# Patient Record
Sex: MALE | Race: WHITE
[De-identification: names, ages, dates, MRNs, and addresses within clinical notes are randomized per-mention and may not be internally consistent; named-entity substitution may affect disease eponyms.]

---

## 2018-02-14 ENCOUNTER — HOSPITAL ENCOUNTER (EMERGENCY)
Dept: HOSPITAL 41 - JD.ED | Age: 38
LOS: 1 days | Discharge: ANOTHER HOSPITAL | End: 2018-02-15
Payer: COMMERCIAL

## 2018-02-14 VITALS — DIASTOLIC BLOOD PRESSURE: 86 MMHG | SYSTOLIC BLOOD PRESSURE: 118 MMHG

## 2018-02-14 DIAGNOSIS — T42.4X2A: Primary | ICD-10-CM

## 2018-02-14 DIAGNOSIS — I10: ICD-10-CM

## 2018-02-14 DIAGNOSIS — F17.210: ICD-10-CM

## 2018-02-14 DIAGNOSIS — Z79.899: ICD-10-CM

## 2018-02-14 DIAGNOSIS — B20: ICD-10-CM

## 2018-02-14 DIAGNOSIS — E78.00: ICD-10-CM

## 2018-02-14 DIAGNOSIS — F10.120: ICD-10-CM

## 2018-02-14 DIAGNOSIS — T44.7X2A: ICD-10-CM

## 2018-02-14 LAB — APAP SERPL-MCNC: 0 UG/ML (ref 10–30)

## 2018-02-14 PROCEDURE — 96361 HYDRATE IV INFUSION ADD-ON: CPT

## 2018-02-14 PROCEDURE — 93005 ELECTROCARDIOGRAM TRACING: CPT

## 2018-02-14 PROCEDURE — 96360 HYDRATION IV INFUSION INIT: CPT

## 2018-02-14 PROCEDURE — 36415 COLL VENOUS BLD VENIPUNCTURE: CPT

## 2018-02-14 PROCEDURE — 71045 X-RAY EXAM CHEST 1 VIEW: CPT

## 2018-02-14 PROCEDURE — 80053 COMPREHEN METABOLIC PANEL: CPT

## 2018-02-14 PROCEDURE — 82962 GLUCOSE BLOOD TEST: CPT

## 2018-02-14 PROCEDURE — 85025 COMPLETE CBC W/AUTO DIFF WBC: CPT

## 2018-02-14 PROCEDURE — G0480 DRUG TEST DEF 1-7 CLASSES: HCPCS

## 2018-02-14 PROCEDURE — 96366 THER/PROPH/DIAG IV INF ADDON: CPT

## 2018-02-14 PROCEDURE — 80306 DRUG TEST PRSMV INSTRMNT: CPT

## 2018-02-14 PROCEDURE — 99285 EMERGENCY DEPT VISIT HI MDM: CPT

## 2018-02-14 PROCEDURE — 96365 THER/PROPH/DIAG IV INF INIT: CPT

## 2018-02-14 NOTE — EDM.PDOCBH
ED HPI GENERAL MEDICAL PROBLEM





- General


Chief Complaint: Behavioral/Psych


Stated Complaint: [facility]


Time Seen by Provider: 02/14/18 18:53


Source of Information: Reports: Patient, EMS, Family


History Limitations: Reports: Intoxication





- History of Present Illness


INITIAL COMMENTS - FREE TEXT/NARRATIVE: 





The patient presents by [facility] for a possible overdose.  He says 

today he got in an argument with his partner and he drank some alcohol.  He 

also took some pills.  He texted his mom he took 20 xanax and 209 tablets of 

metoprolol.  Ambulance was called and they found out it was more like 20 xanax 

and 50 of metoprolol.  When I went to talk to the patient he said it was more 

like 3 of xanax and no metoprolol.  He says he was being dramatic.  He was not 

trying to hurt himself.  He has no headache, fever, chills, cough, chest pain, 

shortness of breath, abdominal pain, nausea or vomiting.  He does admit to 

drinking to much alcohol.  He drank some vodka and other alcohol.


Onset: Gradual


Duration: Hour(s):


Severity: Moderate


Improves with: Reports: None


Worsens with: Reports: None


Associated Symptoms: Reports: No Other Symptoms


Treatments PTA: Reports: IV/IO





- Related Data


 Allergies











Allergy/AdvReac Type Severity Reaction Status Date / Time


 


No Known Allergies Allergy   Verified 02/14/18 18:28











Home Meds: 


 Home Meds





Emtricitab/Rilpivirine/Tenofov [Complera] 1 tab PO DAILY 07/04/16 [History]


Fish Oil/Borage/Flax/Om3,6,9#1 [Omega 3-6-9 1,200 mg Softgel] 1 tab PO DAILY 07/ 04/16 [History]


Metoprolol Succinate 50 mg PO BID 07/04/16 [History]


Multivitamin [Gummi Bear Multivitamin] 1 tab PO DAILY 07/04/16 [History]


Ubidecarenone [Co Q-10] 100 mg PO DAILY 07/04/16 [History]


atorvaSTATin [Lipitor] 20 mg PO DAILY 07/04/16 [History]


Escitalopram [Lexapro] 10 mg PO DAILY 02/14/18 [History]











Past Medical History


Cardiovascular History: Reports: High Cholesterol, Hypertension


Other Gastrointestinal History: colitis


Psychiatric History: Reports: Addiction, Depression


Immunologic History: Reports: AIDS, HIV





- Infectious Disease History


Infectious Disease History: Reports: HIV-Human Immunodeficiency Virus





- Past Surgical History


GI Surgical History: Reports: Hernia Repair/Other





Social & Family History





- Family History


Cardiac: Reports: High Cholesterol, Hypertension, MI





- Tobacco Use


Smoking Status *Q: Current Every Day Smoker


Years of Tobacco use: 25


Packs/Tins Daily: 0.5





- Caffeine Use


Caffeine Use: Reports: None





- Alcohol Use


Days Per Week of Alcohol Use: 7


Number of Drinks Per Day: 15


Total Drinks Per Week: 105





- Recreational Drug Use


Recreational Drug Use: Yes


Drug Use in Last 12 Months: Yes


Recreational Drug Type: Reports: Marijuana/Hashish


Recreational Drug Use Frequency: Daily





ED ROS GENERAL





- Review of Systems


Review Of Systems: See Below


Constitutional: Reports: No Symptoms


HEENT: Reports: No Symptoms


Respiratory: Reports: No Symptoms


Cardiovascular: Reports: No Symptoms


Endocrine: Reports: No Symptoms


GI/Abdominal: Reports: No Symptoms


: Reports: No Symptoms


Musculoskeletal: Reports: No Symptoms


Skin: Reports: No Symptoms


Neurological: Reports: No Symptoms





ED EXAM, BEHAVIORAL HEALTH





- Physical Exam


Exam: See Below


Exam Limited By: Intoxication


General Appearance: Alert, No Apparent Distress


Ears: Normal External Exam


Nose: Normal Inspection


Head: Atraumatic, Normocephalic


Neck: Normal Inspection


Respiratory/Chest: No Respiratory Distress, Lungs Clear, Normal Breath Sounds


Cardiovascular: Regular Rate, Rhythm, No Edema, No Murmur


GI/Abdominal: Soft, Non-Tender, No Organomegaly, No Mass


Back Exam: Normal Inspection


Extremities: Normal Inspection


Neurological: Alert, No Motor/Sensory Deficits, Oriented x 3, Other (Slurred 

speech)





COURSE, BEHAVIORAL HEALTH COMP





- Course


Vital Signs: 


 Last Vital Signs











Temp  97.9 F   02/14/18 18:28


 


Pulse  77   02/14/18 18:28


 


Resp  15   02/14/18 18:28


 


BP  118/86   02/14/18 18:28


 


Pulse Ox  95   02/14/18 18:28











Orders, Labs, Meds: 


 Active Orders 24 hr











 Category Date Time Status


 


 Blood Glucose Check, Bedside [RC] ONETIME Care  02/14/18 18:53 Active


 


 Cardiac Monitoring [RC] .AS DIRECTED Care  02/14/18 18:53 Active


 


 EKG 12 Lead [EKG Documentation Completion] [RC] STAT Care  02/14/18 18:47 

Active


 


 Peripheral IV Care [RC] .AS DIRECTED Care  02/14/18 20:24 Ordered


 


 Chest 1V Frontal [CR] Stat Exams  02/14/18 18:43 Taken


 


 Sodium Chloride 0.9% [Saline Flush] Med  02/14/18 20:23 Ordered





 10 ml FLUSH ASDIRECTED PRN   


 


 Peripheral IV Insertion Adult [OM.PC] Routine Oth  02/14/18 20:23 Ordered








 Medication Orders





Sodium Chloride (Saline Flush)  10 ml FLUSH ASDIRECTED PRN


   PRN Reason: Keep Vein Open





 Laboratory Tests











  02/14/18 02/14/18 02/14/18 Range/Units





  18:50 18:50 18:50 


 


WBC   8.67   (4.23-9.07)  K/mm3


 


RBC   5.10   (4.63-6.08)  M/mm3


 


Hgb   16.0   (13.7-17.5)  gm/L


 


Hct   47.7   (40.1-51.0)  %


 


MCV   93.5 H   (79.0-92.2)  fl


 


MCH   31.4   (25.7-32.2)  pg


 


MCHC   33.5   (32.2-35.5)  g/dl


 


RDW Std Deviation   47.2 H   (35.1-43.9)  fL


 


Plt Count   182   (163-337)  K/mm3


 


MPV   11.2   (9.4-12.3)  fl


 


Neut % (Auto)   53.8   (34.0-67.9)  %


 


Lymph % (Auto)   33.1   (21.8-53.1)  %


 


Mono % (Auto)   8.0   (5.3-12.2)  %


 


Eos % (Auto)   4.7   (0.8-7.0)  


 


Baso % (Auto)   0.2   (0.1-1.2)  %


 


Neut # (Auto)   4.66   (1.78-5.38)  K/mm3


 


Lymph # (Auto)   2.87   (1.32-3.57)  K/mm3


 


Mono # (Auto)   0.69   (0.30-0.82)  K/mm3


 


Eos # (Auto)   0.41   (0.04-0.54)  K/mm3


 


Baso # (Auto)   0.02   (0.01-0.08)  K/mm3


 


Sodium    143  (136-145)  mEq/L


 


Potassium    4.0  (3.5-5.1)  mEq/L


 


Chloride    108 H  ()  mEq/L


 


Carbon Dioxide    25  (21-32)  mEq/L


 


Anion Gap    14.0  (5-15)  


 


BUN    9  (7-18)  mg/dL


 


Creatinine    1.2  (0.7-1.3)  mg/dL


 


Est Cr Clr Drug Dosing    82.65  mL/min


 


Estimated GFR (MDRD)    > 60  (>60)  mL/min


 


BUN/Creatinine Ratio    7.5 L  (14-18)  


 


Glucose    94  ()  mg/dL


 


POC Glucose     ()  mg/dL


 


Calcium    8.6  (8.5-10.1)  mg/dL


 


Total Bilirubin    0.4  (0.2-1.0)  mg/dL


 


AST    47 H  (15-37)  U/L


 


ALT    70 H  (16-63)  U/L


 


Alkaline Phosphatase    92  ()  U/L


 


Total Protein    7.6  (6.4-8.2)  g/dl


 


Albumin    3.7  (3.4-5.0)  g/dl


 


Globulin    3.9  gm/dL


 


Albumin/Globulin Ratio    1.0  (1-2)  


 


Salicylates     (2.8-20)  mg/dL


 


Urine Opiates Screen  Negative    (NEGATIVE)  


 


Ur Buprenorphine Scrn  Negative    (NEGATIVE)  


 


Ur Oxycodone Screen  Negative    (NEGATIVE)  


 


Urine Methadone Screen  Negative    (NEGATIVE)  


 


Ur Propoxyphene Screen  Negative    (NEGATIVE)  


 


Acetaminophen    0 L  (10-30)  ug/mL


 


Ur Barbiturates Screen  Negative    (NEGATIVE)  


 


Ur Tricyclics Screen  Negative    (NEGATIVE)  


 


Ur Phencyclidine Scrn  Negative    (NEGATIVE)  


 


Ur Amphetamine Screen  Negative    (NEGATIVE)  


 


U Methamphetamines Scrn  Negative    (NEGATIVE)  


 


U Benzodiazepines Scrn  Presumptive positive H    (NEGATIVE)  


 


U Cocaine Metab Screen  Negative    (NEGATIVE)  


 


U Marijuana (THC) Screen  Presumptive positive H    (NEGATIVE)  


 


Ethyl Alcohol    0.19  (0.00)  gm%














  02/14/18 02/14/18 Range/Units





  18:50 19:19 


 


WBC    (4.23-9.07)  K/mm3


 


RBC    (4.63-6.08)  M/mm3


 


Hgb    (13.7-17.5)  gm/L


 


Hct    (40.1-51.0)  %


 


MCV    (79.0-92.2)  fl


 


MCH    (25.7-32.2)  pg


 


MCHC    (32.2-35.5)  g/dl


 


RDW Std Deviation    (35.1-43.9)  fL


 


Plt Count    (163-337)  K/mm3


 


MPV    (9.4-12.3)  fl


 


Neut % (Auto)    (34.0-67.9)  %


 


Lymph % (Auto)    (21.8-53.1)  %


 


Mono % (Auto)    (5.3-12.2)  %


 


Eos % (Auto)    (0.8-7.0)  


 


Baso % (Auto)    (0.1-1.2)  %


 


Neut # (Auto)    (1.78-5.38)  K/mm3


 


Lymph # (Auto)    (1.32-3.57)  K/mm3


 


Mono # (Auto)    (0.30-0.82)  K/mm3


 


Eos # (Auto)    (0.04-0.54)  K/mm3


 


Baso # (Auto)    (0.01-0.08)  K/mm3


 


Sodium    (136-145)  mEq/L


 


Potassium    (3.5-5.1)  mEq/L


 


Chloride    ()  mEq/L


 


Carbon Dioxide    (21-32)  mEq/L


 


Anion Gap    (5-15)  


 


BUN    (7-18)  mg/dL


 


Creatinine    (0.7-1.3)  mg/dL


 


Est Cr Clr Drug Dosing    mL/min


 


Estimated GFR (MDRD)    (>60)  mL/min


 


BUN/Creatinine Ratio    (14-18)  


 


Glucose    ()  mg/dL


 


POC Glucose   85  ()  mg/dL


 


Calcium    (8.5-10.1)  mg/dL


 


Total Bilirubin    (0.2-1.0)  mg/dL


 


AST    (15-37)  U/L


 


ALT    (16-63)  U/L


 


Alkaline Phosphatase    ()  U/L


 


Total Protein    (6.4-8.2)  g/dl


 


Albumin    (3.4-5.0)  g/dl


 


Globulin    gm/dL


 


Albumin/Globulin Ratio    (1-2)  


 


Salicylates  4.1   (2.8-20)  mg/dL


 


Urine Opiates Screen    (NEGATIVE)  


 


Ur Buprenorphine Scrn    (NEGATIVE)  


 


Ur Oxycodone Screen    (NEGATIVE)  


 


Urine Methadone Screen    (NEGATIVE)  


 


Ur Propoxyphene Screen    (NEGATIVE)  


 


Acetaminophen    (10-30)  ug/mL


 


Ur Barbiturates Screen    (NEGATIVE)  


 


Ur Tricyclics Screen    (NEGATIVE)  


 


Ur Phencyclidine Scrn    (NEGATIVE)  


 


Ur Amphetamine Screen    (NEGATIVE)  


 


U Methamphetamines Scrn    (NEGATIVE)  


 


U Benzodiazepines Scrn    (NEGATIVE)  


 


U Cocaine Metab Screen    (NEGATIVE)  


 


U Marijuana (THC) Screen    (NEGATIVE)  


 


Ethyl Alcohol    (0.00)  gm%








Medications











Generic Name Dose Route Start Last Admin





  Trade Name Freq  PRN Reason Stop Dose Admin


 


Sodium Chloride  10 ml  02/14/18 20:23  





  Saline Flush  FLUSH   





  ASDIRECTED PRN   





  Keep Vein Open   














Discontinued Medications














Generic Name Dose Route Start Last Admin





  Trade Name Freq  PRN Reason Stop Dose Admin


 


Calcium Gluconate  3 gm  02/14/18 20:19  





  Calcium Gluconate  IV  02/14/18 20:20  





  ONETIME ONE   


 


Charcoal  90 gm  02/14/18 20:15  





  Actidose-Aqua  PO  02/14/18 20:16  





  ONETIME ONE   


 


Sodium Chloride  1,000 mls @ 999 mls/hr  02/14/18 18:48  02/14/18 18:57





  Normal Saline  IV  02/14/18 19:48  999 mls/hr





  ONETIME ONE   Administration











Re-Assessment/Re-Exam: 





I ordered an IV NS, EKG, CXR, and labs.  Poison control was contacted and the 

big concern is the metoprolol he may of may not have taken.





His CXR looks good.  His EKG shows a NSR with no acute changes.  His CBC is 

negative.  His point of care glucose is 85.  His glucose on the CMP was 94.  

His AST was slightly elevated at 47.  His ALT was elevated at 70.  His 

salicylates and acetaminophen is negative.  He was presumptive positive for 

benzos and marijuana.  His EOTH was 0.19.  My nurse did a count and he is 

missing 34 of his metoprolol.  He should only have 14 missing so he could have 

taken 20.  There were other metoprolol missing from prior prescriptions.  He 

also had all of his 180 xanax missing.  He got that prescription back in 

August.  Poison control recommended activated charcoal and calcium gluconate 3 

grams IV.  I talked with Dr Leal and she agreed to the admission.  I also 

ordered another IV.





Departure





- Departure


Time of Disposition: 20:35


Disposition: Admitted As Inpatient 64


Condition: Serious


Clinical Impression: 


 Depressive disorder





Alcohol intoxication


Qualifiers:


 Complication of substance-induced condition: uncomplicated Qualified Code(s): 

F10.120 - Alcohol abuse with intoxication, uncomplicated





Drug overdose


Qualifiers:


 Encounter type: initial encounter Injury intent: intentional self-harm 

Qualified Code(s): T50.902A - Poisoning by unspecified drugs, medicaments and 

biological substances, intentional self-harm, initial encounter








- Discharge Information


Forms:  ED Department Discharge





- My Orders


Last 24 Hours: 


My Active Orders





02/14/18 20:23


Sodium Chloride 0.9% [Saline Flush]   10 ml FLUSH ASDIRECTED PRN 


Peripheral IV Insertion Adult [OM.PC] Routine 





02/14/18 20:24


Peripheral IV Care [RC] .AS DIRECTED 














- Assessment/Plan


Last 24 Hours: 


My Active Orders





02/14/18 20:23


Sodium Chloride 0.9% [Saline Flush]   10 ml FLUSH ASDIRECTED PRN 


Peripheral IV Insertion Adult [OM.PC] Routine 





02/14/18 20:24


Peripheral IV Care [RC] .AS DIRECTED

## 2018-02-15 NOTE — CR
Chest: Portable view of the chest was obtained.

 

Comparison: Prior chest x-ray of 08/24/16.

 

Heart size and mediastinum are normal.  Lungs are clear.  Bony 

structures are grossly intact.

 

Impression:

1.  Nothing acute is identified on portable chest x-ray.

 

Diagnostic code #1

## 2020-08-06 NOTE — EDM.PDOC
ED HPI GENERAL MEDICAL PROBLEM





- General


Source of Information: Reports: Patient, EMS, Family (mother), RN Notes Reviewed





<Ede Mcdermott - Last Filed: 08/07/20 06:56>





<Brooks Oquendo - Last Filed: 08/07/20 08:40>





- General


Chief Complaint: Drug or Alcohol Abuse


Stated Complaint: [facility]


Time Seen by Provider: 08/06/20 22:41





- History of Present Illness


INITIAL COMMENTS - FREE TEXT/NARRATIVE: 





39 yr old male has been brought by EMS after found lying on a gravel road 

outside his car somewhere near [place].  He had previously texted and called 

family members that he "wants to die"  He has been drinking vodka, is reported 

to have also "taken a bunch of xanex, aleve PM, possibly some opiods" in 

addition to "acid and meth".   His mother states she has been out of town for a 

few days but know he has been feeling depressed with hx of depression.  He has 

been on lexapro in the past but tells us on arrival to ED he has not been taking

that recently.  Mother states he just found out today that one of his good 

friends only has 3 weeks to live so she thinks that is an extra stress for him. 

 (Ede Mcdermott)





- Related Data


                                    Allergies











Allergy/AdvReac Type Severity Reaction Status Date / Time


 


No Known Allergies Allergy   Verified 02/14/18 18:28











Home Meds: 


                                    Home Meds





Emtricita/Rilpivirine/Tenof Df [Complera] 1 tab PO DAILY 07/04/16 [History]


Fish Oil/Borage/Flax/Om3,6,9 1 [Omega 3-6-9 1,200 mg Softgel] 1 tab PO DAILY 

07/04/16 [History]


Multivitamin [Gummi Bear Multivitamin] 1 tab PO DAILY 07/04/16 [History]


Ubidecarenone [Co Q-10] 100 mg PO DAILY 07/04/16 [History]


atorvaSTATin [Lipitor] 20 mg PO DAILY 07/04/16 [History]


Escitalopram [Lexapro] 10 mg PO DAILY 02/14/18 [History]


Metoprolol Tartrate 100 mg PO DAILY 02/14/18 [History]


Metoprolol Tartrate 50 mg PO BEDTIME 08/01/18 [History]











Past Medical History


Cardiovascular History: Reports: High Cholesterol, Hypertension


Gastrointestinal History: Reports: Diverticulosis


Other Gastrointestinal History: colitis


Genitourinary History: Reports: Acute Renal Failure


Neurological History: Reports: Seizure


Psychiatric History: Reports: Addiction, Anxiety, Depression, Hallucinations


Immunologic History: Reports: AIDS, HIV





- Infectious Disease History


Infectious Disease History: Reports: HIV-Human Immunodeficiency Virus (diagnosed

 2005. No history of AIDS.)





- Past Surgical History


Male  Surgical History: Reports: Other (See Below) (hydrocele at 3 years old)





<Ede Mcdermott MICHAEL - Manolo Filed: 08/07/20 06:56>





Social & Family History





- Family History


Cardiac: Reports: High Cholesterol, Hypertension, MI





- Tobacco Use


Smoking Status *Q: Current Every Day Smoker


Years of Tobacco use: 20


Packs/Tins Daily: 1





- Caffeine Use


Caffeine Use: Reports: Coffee





- Recreational Drug Use


Recreational Drug Type: Reports: Mescaline, Methamphetamine


Other Recreational Drug Type: AMBULANCEE REPORTS THIS-PT DOESN'T RESPOND WITH AN

 ANSWER WHEN ASKED





- Living Situation & Occupation


Living situation: Reports: Single


Occupation: Employed ()





<SharronEde RODRIGUEZ - Last Filed: 08/07/20 06:56>





ED ROS GENERAL





- Review of Systems


Review Of Systems: Unable To Obtain


Reason Not Obtained: altered mental status





<SharronEde French Filed: 08/07/20 06:56>





- Physical Exam


Exam: See Below


General Appearance: Other (awake on arrival to ED but drowsy, does answer simple

 questions)


Ears: Normal External Exam


Nose: Normal Inspection


Head Exam: Atraumatic


Neck: Supple


Respiratory/Chest: No Respiratory Distress, Lungs Clear, Normal Breath Sounds.  

No: Rhonchi, Wheezing


Cardiovascular: Tachycardia


GI/Abdominal: Soft, Non-Tender


Neuro Exam (Abbreviated): Other (pt is very drowsy, arouseable, does answer a 

few yes and no questions but not talkative at time of arrival to ED and initial 

exam)


Extremities: Normal Inspection


Skin Exam: Warm, Dry, Normal Color





<SharronEed L Demetra French Filed: 08/07/20 06:56>





EKG INTERPRETATION


EKG Date: 08/06/20


Rhythm: Other (sinus tach, rate 101)


Axis: Normal


P-Wave: Present


QRS: Normal


ST-T: Normal





<SharronEde renae - Last Filed: 08/07/20 06:56>





Course





<Ede Mcdermott - Last Filed: 08/07/20 06:56>





<Brooks Oquendo - Last Filed: 08/07/20 08:40>





- Vital Signs


Last Recorded V/S: 





                                Last Vital Signs











Temp  36.2 C   08/06/20 22:41


 


Pulse  80   08/07/20 06:24


 


Resp  20   08/07/20 06:24


 


BP  107/66   08/07/20 06:24


 


Pulse Ox  97   08/07/20 06:24














- Orders/Labs/Meds


Orders: 





                               Active Orders 24 hr











 Category Date Time Status


 


 EKG 12 Lead [EKG Documentation Completion] [RC] STAT Care  08/06/20 22:49 

Active


 


 Peripheral IV Care [RC] .AS DIRECTED Care  08/06/20 22:50 Active


 


 Consult to Case Management/ [CONS] Cons  08/07/20 06:39 Active





 Routine   


 


 Dextrose 5%-Lactated Ringers 1,000 ml Med  08/07/20 06:00 Active





 IV ASDIRECTED   


 


 Lactated Ringers [Ringers, Lactated] 1,000 ml Med  08/07/20 02:15 Active





 IV ASDIRECTED   


 


 Sodium Chloride 0.9% [Normal Saline] 1,000 ml Med  08/06/20 23:00 Active





 IV ONETIME   


 


 Sodium Chloride 0.9% [Saline Flush] Med  08/06/20 22:50 Active





 10 ml FLUSH ASDIRECTED PRN   


 


 Peripheral IV Insertion Adult [OM.PC] Stat Oth  08/06/20 22:50 Ordered








                                Medication Orders





Sodium Chloride (Normal Saline)  1,000 mls @ 999 mls/hr IV ONETIME IRINEO


   Last Admin: 08/06/20 22:59  Dose: 999 mls/hr


   Documented by: FREDIS


Lactated Ringer's (Ringers, Lactated)  1,000 mls @ 500 mls/hr IV ASDIRECTED IRINEO


   Last Admin: 08/07/20 03:42  Dose: 500 mls/hr


   Documented by: ALEXEY


Dextrose/Lactated Ringer's (Dextrose 5%-Lactated Ringers)  1,000 mls @ 150 

mls/hr IV ASDIRECTED IRINEO


   Last Admin: 08/07/20 06:21  Dose: 150 mls/hr


   Documented by: ALEXEY


Sodium Chloride (Saline Flush)  10 ml FLUSH ASDIRECTED PRN


   PRN Reason: Keep Vein Open


   Last Admin: 08/06/20 22:59  Dose: 10 ml


   Documented by: FREDIS








Labs: 





                                Laboratory Tests











  08/06/20 08/06/20 08/06/20 Range/Units





  23:13 23:30 23:30 


 


WBC   22.41 H   (4.23-9.07)  K/mm3


 


RBC   4.72   (4.63-6.08)  M/mm3


 


Hgb   15.1   (13.7-17.5)  gm/dl


 


Hct   44.1   (40.1-51.0)  %


 


MCV   93.4 H   (79.0-92.2)  fl


 


MCH   32.0   (25.7-32.2)  pg


 


MCHC   34.2   (32.2-35.5)  g/dl


 


RDW Std Deviation   43.8   (35.1-43.9)  fL


 


Plt Count   238   (163-337)  K/mm3


 


MPV   11.0   (9.4-12.3)  fl


 


Neut % (Auto)   81.3 H   (34.0-67.9)  %


 


Lymph % (Auto)   9.2 L   (21.8-53.1)  %


 


Mono % (Auto)   8.2   (5.3-12.2)  %


 


Eos % (Auto)   0.8   (0.8-7.0)  


 


Baso % (Auto)   0.2   (0.1-1.2)  %


 


Neut # (Auto)   18.23 H   (1.78-5.38)  K/mm3


 


Lymph # (Auto)   2.06   (1.32-3.57)  K/mm3


 


Mono # (Auto)   1.84 H   (0.30-0.82)  K/mm3


 


Eos # (Auto)   0.17   (0.04-0.54)  K/mm3


 


Baso # (Auto)   0.04   (0.01-0.08)  K/mm3


 


Manual Slide Review   Abnormal smear   


 


Sodium    146 H  (136-145)  mEq/L


 


Potassium    3.3 L  (3.5-5.1)  mEq/L


 


Chloride    106  ()  mEq/L


 


Carbon Dioxide    24  (21-32)  mEq/L


 


Anion Gap    19.3 H  (5-15)  


 


BUN    18  (7-18)  mg/dL


 


Creatinine    1.4 H  (0.7-1.3)  mg/dL


 


Est Cr Clr Drug Dosing    69.44  mL/min


 


Estimated GFR (MDRD)    56  (>60)  mL/min


 


BUN/Creatinine Ratio    12.9 L  (14-18)  


 


Glucose    111 H  ()  mg/dL


 


Calcium    8.0 L  (8.5-10.1)  mg/dL


 


Magnesium     (1.8-2.4)  mg/dl


 


Total Bilirubin    0.7  (0.2-1.0)  mg/dL


 


AST    31  (15-37)  U/L


 


ALT    24  (16-63)  U/L


 


Alkaline Phosphatase    47  ()  U/L


 


Total Protein    7.0  (6.4-8.2)  g/dl


 


Albumin    4.0  (3.4-5.0)  g/dl


 


Globulin    3.0  gm/dL


 


Albumin/Globulin Ratio    1.3  (1-2)  


 


Urine Opiates Screen  Negative    (BLMLDX=193)  


 


Ur Buprenorphine Scrn  Negative    (CUTOFF=10)  


 


Ur Oxycodone Screen  Negative    (BLH6YY=868)  


 


Urine Methadone Screen  Negative    (VVR3JR=622)  


 


Ur Propoxyphene Screen  Negative    (OOMIFT=091)  


 


Acetaminophen     (10-30)  ug/mL


 


Ur Barbiturates Screen  Negative    (YVAKBO=692)  


 


Ur Tricyclics Screen  Negative    (WZSBNE=255)  


 


Ur Phencyclidine Scrn  Negative    (CUTOFF=25)  


 


Ur Amphetamine Screen  Presumptive positive H    (PWCFPY=854)  


 


U Methamphetamines Scrn  Negative    (GXHBET=005)  


 


U Benzodiazepines Scrn  Presumptive positive H    (FZSBWK=878)  


 


U Cocaine Metab Screen  Negative    (WVOLBY=540)  


 


U Marijuana (THC) Screen  Presumptive positive H    (CUTOFF=50)  


 


Ethyl Alcohol    0.24  (0.00)  gm%


 


COVID-19 (AMARILYS)     (NEGATIVE)  














  08/06/20 08/06/20 08/07/20 Range/Units





  23:30 23:30 06:20 


 


WBC     (4.23-9.07)  K/mm3


 


RBC     (4.63-6.08)  M/mm3


 


Hgb     (13.7-17.5)  gm/dl


 


Hct     (40.1-51.0)  %


 


MCV     (79.0-92.2)  fl


 


MCH     (25.7-32.2)  pg


 


MCHC     (32.2-35.5)  g/dl


 


RDW Std Deviation     (35.1-43.9)  fL


 


Plt Count     (163-337)  K/mm3


 


MPV     (9.4-12.3)  fl


 


Neut % (Auto)     (34.0-67.9)  %


 


Lymph % (Auto)     (21.8-53.1)  %


 


Mono % (Auto)     (5.3-12.2)  %


 


Eos % (Auto)     (0.8-7.0)  


 


Baso % (Auto)     (0.1-1.2)  %


 


Neut # (Auto)     (1.78-5.38)  K/mm3


 


Lymph # (Auto)     (1.32-3.57)  K/mm3


 


Mono # (Auto)     (0.30-0.82)  K/mm3


 


Eos # (Auto)     (0.04-0.54)  K/mm3


 


Baso # (Auto)     (0.01-0.08)  K/mm3


 


Manual Slide Review     


 


Sodium     (136-145)  mEq/L


 


Potassium     (3.5-5.1)  mEq/L


 


Chloride     ()  mEq/L


 


Carbon Dioxide     (21-32)  mEq/L


 


Anion Gap     (5-15)  


 


BUN     (7-18)  mg/dL


 


Creatinine     (0.7-1.3)  mg/dL


 


Est Cr Clr Drug Dosing     mL/min


 


Estimated GFR (MDRD)     (>60)  mL/min


 


BUN/Creatinine Ratio     (14-18)  


 


Glucose     ()  mg/dL


 


Calcium     (8.5-10.1)  mg/dL


 


Magnesium   1.9   (1.8-2.4)  mg/dl


 


Total Bilirubin     (0.2-1.0)  mg/dL


 


AST     (15-37)  U/L


 


ALT     (16-63)  U/L


 


Alkaline Phosphatase     ()  U/L


 


Total Protein     (6.4-8.2)  g/dl


 


Albumin     (3.4-5.0)  g/dl


 


Globulin     gm/dL


 


Albumin/Globulin Ratio     (1-2)  


 


Urine Opiates Screen     (JYSCII=392)  


 


Ur Buprenorphine Scrn     (CUTOFF=10)  


 


Ur Oxycodone Screen     (UOZ8FR=289)  


 


Urine Methadone Screen     (LSS5NA=466)  


 


Ur Propoxyphene Screen     (JKIUBY=260)  


 


Acetaminophen  0 L    (10-30)  ug/mL


 


Ur Barbiturates Screen     (WBCTXB=356)  


 


Ur Tricyclics Screen     (GRVWJV=139)  


 


Ur Phencyclidine Scrn     (CUTOFF=25)  


 


Ur Amphetamine Screen     (LPUJDL=003)  


 


U Methamphetamines Scrn     (GJPKZT=447)  


 


U Benzodiazepines Scrn     (YDUHBS=607)  


 


U Cocaine Metab Screen     (AITSMF=499)  


 


U Marijuana (THC) Screen     (CUTOFF=50)  


 


Ethyl Alcohol     (0.00)  gm%


 


COVID-19 ([facility]ILYS)    Negative  (NEGATIVE)  











Meds: 





Medications











Generic Name Dose Route Start Last Admin





  Trade Name Freq  PRN Reason Stop Dose Admin


 


Sodium Chloride  1,000 mls @ 999 mls/hr  08/06/20 23:00  08/06/20 22:59





  Normal Saline  IV   999 mls/hr





  ONETIME IRINEO   Administration


 


Lactated Ringer's  1,000 mls @ 500 mls/hr  08/07/20 02:15  08/07/20 03:42





  Ringers, Lactated  IV   500 mls/hr





  ASDIRECTED IRINEO   Administration


 


Dextrose/Lactated Ringer's  1,000 mls @ 150 mls/hr  08/07/20 06:00  08/07/20 

06:21





  Dextrose 5%-Lactated Ringers  IV   150 mls/hr





  ASDIRECTED IRINEO   Administration


 


Sodium Chloride  10 ml  08/06/20 22:50  08/06/20 22:59





  Saline Flush  FLUSH   10 ml





  ASDIRECTED PRN   Administration





  Keep Vein Open  














Discontinued Medications














Generic Name Dose Route Start Last Admin





  Trade Name Freq  PRN Reason Stop Dose Admin


 


Lactated Ringer's  1,000 mls @ 999 mls/hr  08/07/20 00:09  08/07/20 00:49





  Ringers, Lactated  IV  08/07/20 01:09  999 mls/hr





  .BOLUS ONE   Administration














- Re-Assessments/Exams


Free Text/Narrative Re-Assessment/Exam: 





08/07/20 01:30.  blood alcohol 0.25.  Drug screen pos for meth, benzo., 

marijuana.  Current vitals 102/76, 91, 02 sat 92 %.  Acetaminophen level zero.  

Moderately dehydrated with anion gap of 18.  Have given 1 liter NS, 1 liter LR, 

will now go to LR at 150 per hr. He has been sleeping and continues to sleep.  

Will continue to treat until AM, see what his status is at that time and than 

work on appropriate disposition. 





08/07/20 06:32. Pt readily awakened.  Answering questions appropriately. 

Remembers last evening.  States he drank too much alcohol, was stressed over 

trying to deal with a friend that is dying from cancer.  He denies feeling 

suicidal this morning now that the alcohol is wearing off.  He states he does 

not want to die, it was the alcohol that made him make those statements last 

evening. Will continue to hold him here in the ED and have one of our social 

workers visit with him when arrive in about an hour to make sure there is a safe

 plan. 


07:00. It is time for change of shift. Will transfer care to Dr Oquendo. 








 (Ede Mcdermott)


Free Text/Narrative Re-Assessment/Exam: 





08/07/20 08:38


The patient was evaluated by social work and feels he is safe to be discharged 

to the community.  He agrees to [facility] and start going to AA 

again it has been 3 months since he is gone.  I discussed the situation with the

 patient and to me he absolutely adamantly denies any suicidal wishes or intent.

  And he agrees to return to the emergency room if he ever starts feeling this 

way again.  Alcohol and drugs tend to bring this on for him.  The patient agrees

 to drinking does not fix anything, and will start going to AA again.  He is 

scheduled to see [facility] today. (Brooks Oquendo)





Departure





<Ede Mcdermott - Last Filed: 08/07/20 06:56>





- Departure


Time of Disposition: 08:39





<Brooks Oquendo - Last Filed: 08/07/20 08:40>





- Departure


Disposition: Home, Self-Care 01


Clinical Impression: 


 Alcoholism, Benzodiazepine abuse





Alcoholic intoxication


Qualifiers:


 Complication of substance-induced condition: uncomplicated Qualified Code(s): 

F10.120 - Alcohol abuse with intoxication, uncomplicated








- Discharge Information


Referrals: 


PCP,Unknown [Primary Care Provider] - 


Additional Instructions: 


Return to the emergency room with any questions problems or worsening symptoms 

return with any thoughts of hurting yourself or anyone else.





Follow-up with [facility] today as we discussed.





Start going to AA again immediately.





Sepsis Event Note (ED)





- Evaluation


Sepsis Screening Result: No Definite Risk





<Ede Mcdermott - Last Filed: 08/07/20 06:56>





- Focused Exam


Vital Signs: 





                                   Vital Signs











  Temp Pulse Resp BP Pulse Ox


 


 08/07/20 06:24   80  20  107/66  97


 


 08/07/20 05:13   77  20  101/64  96


 


 08/07/20 03:41   85  18  99/64  95


 


 08/07/20 00:10   89  16  102/84  92 L


 


 08/06/20 22:41  36.2 C  107 H  18  135/69  91 L